# Patient Record
Sex: FEMALE | Race: BLACK OR AFRICAN AMERICAN | Employment: OTHER | URBAN - METROPOLITAN AREA
[De-identification: names, ages, dates, MRNs, and addresses within clinical notes are randomized per-mention and may not be internally consistent; named-entity substitution may affect disease eponyms.]

---

## 2022-07-05 ENCOUNTER — APPOINTMENT (OUTPATIENT)
Dept: GENERAL RADIOLOGY | Age: 67
End: 2022-07-05
Payer: MEDICARE

## 2022-07-05 ENCOUNTER — HOSPITAL ENCOUNTER (EMERGENCY)
Age: 67
Discharge: HOME | End: 2022-07-05
Attending: EMERGENCY MEDICINE
Payer: MEDICARE

## 2022-07-05 VITALS
RESPIRATION RATE: 17 BRPM | OXYGEN SATURATION: 96 % | SYSTOLIC BLOOD PRESSURE: 146 MMHG | BODY MASS INDEX: 27.64 KG/M2 | WEIGHT: 156 LBS | HEART RATE: 94 BPM | HEIGHT: 63 IN | DIASTOLIC BLOOD PRESSURE: 79 MMHG | TEMPERATURE: 97.7 F

## 2022-07-05 DIAGNOSIS — U07.1 COVID-19: Primary | ICD-10-CM

## 2022-07-05 LAB
RAPID INFLUENZA  B AGN: NEGATIVE
RAPID INFLUENZA A AGN: NEGATIVE
SARS-COV-2, NAAT: DETECTED
SOURCE: ABNORMAL

## 2022-07-05 PROCEDURE — 87635 SARS-COV-2 COVID-19 AMP PRB: CPT

## 2022-07-05 PROCEDURE — 96372 THER/PROPH/DIAG INJ SC/IM: CPT

## 2022-07-05 PROCEDURE — 6360000002 HC RX W HCPCS: Performed by: EMERGENCY MEDICINE

## 2022-07-05 PROCEDURE — 99284 EMERGENCY DEPT VISIT MOD MDM: CPT

## 2022-07-05 PROCEDURE — 87804 INFLUENZA ASSAY W/OPTIC: CPT

## 2022-07-05 PROCEDURE — 71045 X-RAY EXAM CHEST 1 VIEW: CPT

## 2022-07-05 RX ORDER — SPIRONOLACTONE 50 MG/1
1 TABLET, FILM COATED ORAL DAILY
COMMUNITY
Start: 2022-06-21

## 2022-07-05 RX ORDER — KETOROLAC TROMETHAMINE 30 MG/ML
30 INJECTION, SOLUTION INTRAMUSCULAR; INTRAVENOUS ONCE
Status: COMPLETED | OUTPATIENT
Start: 2022-07-05 | End: 2022-07-05

## 2022-07-05 RX ADMIN — KETOROLAC TROMETHAMINE 30 MG: 30 INJECTION, SOLUTION INTRAMUSCULAR; INTRAVENOUS at 20:09

## 2022-07-05 ASSESSMENT — ENCOUNTER SYMPTOMS
SORE THROAT: 0
SINUS PRESSURE: 0
RHINORRHEA: 1
DIARRHEA: 0
VOMITING: 0
NAUSEA: 0
SHORTNESS OF BREATH: 0
ABDOMINAL PAIN: 0
WHEEZING: 0
CONSTIPATION: 0
COUGH: 1

## 2022-07-05 ASSESSMENT — PAIN DESCRIPTION - LOCATION: LOCATION: OTHER (COMMENT);GENERALIZED

## 2022-07-05 ASSESSMENT — PAIN SCALES - GENERAL: PAINLEVEL_OUTOF10: 4

## 2022-07-05 NOTE — ED PROVIDER NOTES
Emergency Department Encounter    Patient: Hanna Oliver  MRN: [id number]  : 1955  Date of Evaluation: 2022  ED Provider:  [id number] [facility],     Triage Chief Complaint:   Headache, Fatigue, and Nausea    HPI:  Hanna Oliver is a 78 y.o. female medical history significant for hypertension and hyperlipidemia who presents with URI symptoms. Patient has had 24 hours of cough, congestion with associated headache and myalgia. Patient did have 1 episode of watery diarrhea. She does have sick contacts, Jamir Cervantes does have similar symptoms. She is vaccinated for COVID-19. She describes her headache as \"slight. \"  Denies any fever, chills, chest pain, shortness of breath, palpitations, emesis, dysuria, abdominal pain. ROS:  Review of Systems   Constitutional: Negative for chills and fever. HENT: Positive for congestion and rhinorrhea. Negative for sinus pressure and sore throat. Eyes: Negative for visual disturbance. Respiratory: Positive for cough. Negative for shortness of breath and wheezing. Cardiovascular: Negative for chest pain and palpitations. Gastrointestinal: Negative for abdominal pain, constipation, diarrhea, nausea and vomiting. Genitourinary: Negative for dysuria, frequency and urgency. Musculoskeletal: Positive for myalgias. Negative for arthralgias. Skin: Negative for rash. Neurological: Positive for headaches. Negative for dizziness and syncope. Psychiatric/Behavioral: Negative for agitation, behavioral problems and confusion. Past Medical History:   Diagnosis Date    Hyperlipidemia     Hypertension      Past Surgical History:   Procedure Laterality Date    COLONOSCOPY      HYSTERECTOMY (CERVIX STATUS UNKNOWN)      TONSILLECTOMY      TUMOR REMOVAL      abdominal, benign     No family history on file. Social History     Socioeconomic History    Marital status:       Spouse name: Not on file    Number of children: Not on file    Years of education: Not on file    Highest education level: Not on file   Occupational History    Not on file   Tobacco Use    Smoking status: Former Smoker    Smokeless tobacco: Never Used   Vaping Use    Vaping Use: Never used   Substance and Sexual Activity    Alcohol use: Yes     Comment: occas    Drug use: No    Sexual activity: Not on file   Other Topics Concern    Not on file   Social History Narrative    Not on file     Social Determinants of Health     Financial Resource Strain:     Difficulty of Paying Living Expenses: Not on file   Food Insecurity:     Worried About Running Out of Food in the Last Year: Not on file    Giovanny of Food in the Last Year: Not on file   Transportation Needs:     Lack of Transportation (Medical): Not on file    Lack of Transportation (Non-Medical): Not on file   Physical Activity:     Days of Exercise per Week: Not on file    Minutes of Exercise per Session: Not on file   Stress:     Feeling of Stress : Not on file   Social Connections:     Frequency of Communication with Friends and Family: Not on file    Frequency of Social Gatherings with Friends and Family: Not on file    Attends Gnosticist Services: Not on file    Active Member of [employer] or Organizations: Not on file    Attends Club or Organization Meetings: Not on file    Marital Status: Not on file   Intimate Partner Violence:     Fear of Current or Ex-Partner: Not on file    Emotionally Abused: Not on file    Physically Abused: Not on file    Sexually Abused: Not on file   Housing Stability:     Unable to Pay for Housing in the Last Year: Not on file    Number of [place] in the Last Year: Not on file    Unstable Housing in the Last Year: Not on file     No current facility-administered medications for this encounter.      Current Outpatient Medications   Medication Sig Dispense Refill    spironolactone (ALDACTONE) 50 MG tablet Take 1 tablet by mouth daily      losartan (COZAAR) 100 MG tablet Take 100 mg by mouth daily      estradiol (ESTRACE) 1 MG tablet Take 1 mg by mouth daily      chlorthalidone (HYGROTEN) 50 MG tablet Take 50 mg by mouth daily (Patient not taking: Reported on 7/5/2022)      rosuvastatin (CRESTOR) 20 MG tablet Take 20 mg by mouth daily      POTASSIUM CITRATE PO Take 20 mEq by mouth (Patient not taking: Reported on 7/5/2022)      Cholecalciferol (VITAMIN D-3 PO) Take by mouth daily      MULTIPLE VITAMIN PO Take by mouth daily      Multiple Vitamins-Minerals (HAIR VITAMINS PO) Take by mouth daily       No Known Allergies    Nursing Notes Reviewed    Physical Exam:  Triage VS:    ED Triage Vitals   Enc Vitals Group      BP 07/05/22 1845 (!) 146/79      Heart Rate 07/05/22 1844 94      Resp 07/05/22 1844 17      Temp 07/05/22 1844 97.7 °F (36.5 °C)      Temp Source 07/05/22 1844 Infrared      SpO2 07/05/22 1844 96 %      Weight 07/05/22 1844 156 lb (70.8 kg)      Height 07/05/22 1844 5' 3\" (1.6 m)      Head Circumference --       Peak Flow --       Pain Score --       Pain Loc --       Pain Edu? --       Excl. in [address]? --      Physical Exam  Vitals and nursing note reviewed. Constitutional:       General: She is not in acute distress. Appearance: Normal appearance. She is not ill-appearing or toxic-appearing. HENT:      Head: Normocephalic and atraumatic. Nose: Nose normal.      Mouth/Throat:      Mouth: Mucous membranes are moist.   Eyes:      Conjunctiva/sclera: Conjunctivae normal.   Cardiovascular:      Rate and Rhythm: Normal rate and regular rhythm. Pulses: Normal pulses. Pulmonary:      Effort: Pulmonary effort is normal. No respiratory distress. Breath sounds: Normal breath sounds. No wheezing, rhonchi or rales. Abdominal:      General: Abdomen is flat. Bowel sounds are normal. There is no distension. Palpations: Abdomen is soft. Tenderness: There is no abdominal tenderness. There is no guarding or rebound.    Musculoskeletal: General: Normal range of motion. Skin:     General: Skin is warm. Capillary Refill: Capillary refill takes less than 2 seconds. Neurological:      Mental Status: She is alert and oriented to person, place, and time. Mental status is at baseline. Psychiatric:         Mood and Affect: Mood normal.              I have reviewed and interpreted all of the currently available lab results from this visit (if applicable):  Results for orders placed or performed during the hospital encounter of 07/05/22   Rapid Flu Swab    Specimen: Nasopharyngeal   Result Value Ref Range    Rapid Influenza A Ag NEGATIVE NEGATIVE    Rapid Influenza B Ag NEGATIVE NEGATIVE   COVID-19, Rapid    Specimen: Nasopharyngeal   Result Value Ref Range    Source THROAT     SARS-CoV-2, NAAT DETECTED (A) NOT DETECTED      Radiographs (if obtained):    [] Radiologist's Report Reviewed:  XR CHEST PORTABLE   Final Result   No radiographic evidence of acute cardiopulmonary disease. Chart review shows recent radiographs:  No results found. MDM:  Patient seen and examined. Imaging obtained. Chest x-ray with no acute disease. Influenza negative. COVID-positive. Patient is well-appearing, nontoxic-appearing. She has had 3 total doses of the COVID-vaccine. She is unsure when her last dose of the vaccine was. She is afebrile, not tachycardic, not tachypneic, 96% on room air, blood pressure within acceptable limits. At this time I do think patient is appropriate for outpatient follow-up. Given patient's advanced age, medical comorbidities, she may qualify for Paxlovid. I did discuss risks and benefits of this medication with patient, and she is agreeable to this. Medication sent to [facility] medical outpatient pharmacy. Patient to follow-up with primary care provider in 1 to 2 days for video visit.   She is to return to emergency department if symptoms worsen, return precautions are discussed, she does verbalize understanding of these. All questions were answered, she is agreeable with plan of care. Clinical Impression:  1. COVID-19      Disposition referral (if applicable):  Evelyn Damian MD  [address] [place] [address] [id number]    Schedule an appointment as soon as possible for a visit in 2 days      [facility]  [address]  [phone]  Go to   As needed, If symptoms worsen    Disposition medications (if applicable):  Discharge Medication List as of 7/5/2022  9:31 PM      START taking these medications    Details   nirmatrelvir/ritonavir (PAXLOVID) 20 x 150 MG & 10 x 100MG Take 3 tablets (two 150 mg nirmatrelvir and one 100 mg ritonavir tablets) by mouth every 12 hours for 5 days. , Disp-30 tablet, R-0Normal             Comment: Please note this report has been produced using speech recognition software and may contain errors related to that system including errors in grammar, punctuation, and spelling, as well as words and phrases that may be inappropriate. Efforts were made to edit the dictations.        [id number] [facility],   07/05/22 5885

## 2023-04-07 ENCOUNTER — HOSPITAL ENCOUNTER (EMERGENCY)
Age: 68
Discharge: HOME | End: 2023-04-07
Attending: EMERGENCY MEDICINE
Payer: MEDICARE

## 2023-04-07 ENCOUNTER — APPOINTMENT (OUTPATIENT)
Dept: GENERAL RADIOLOGY | Age: 68
End: 2023-04-07
Payer: MEDICARE

## 2023-04-07 VITALS
WEIGHT: 157 LBS | RESPIRATION RATE: 18 BRPM | TEMPERATURE: 97.8 F | BODY MASS INDEX: 27.82 KG/M2 | HEIGHT: 63 IN | OXYGEN SATURATION: 97 % | DIASTOLIC BLOOD PRESSURE: 83 MMHG | SYSTOLIC BLOOD PRESSURE: 164 MMHG | HEART RATE: 87 BPM

## 2023-04-07 DIAGNOSIS — G57.01 PYRIFORMIS SYNDROME, RIGHT: Primary | ICD-10-CM

## 2023-04-07 PROCEDURE — 73502 X-RAY EXAM HIP UNI 2-3 VIEWS: CPT

## 2023-04-07 PROCEDURE — 99284 EMERGENCY DEPT VISIT MOD MDM: CPT

## 2023-04-07 PROCEDURE — 6370000000 HC RX 637 (ALT 250 FOR IP): Performed by: EMERGENCY MEDICINE

## 2023-04-07 PROCEDURE — 96372 THER/PROPH/DIAG INJ SC/IM: CPT

## 2023-04-07 PROCEDURE — 6360000002 HC RX W HCPCS: Performed by: EMERGENCY MEDICINE

## 2023-04-07 RX ORDER — METHOCARBAMOL 500 MG/1
500 TABLET, FILM COATED ORAL 3 TIMES DAILY
Qty: 30 TABLET | Refills: 0 | Status: SHIPPED | OUTPATIENT
Start: 2023-04-07 | End: 2023-04-17

## 2023-04-07 RX ORDER — HYDROCODONE BITARTRATE AND ACETAMINOPHEN 5; 325 MG/1; MG/1
1 TABLET ORAL ONCE
Status: COMPLETED | OUTPATIENT
Start: 2023-04-07 | End: 2023-04-07

## 2023-04-07 RX ORDER — LIDOCAINE 4 G/G
1 PATCH TOPICAL DAILY
Status: DISCONTINUED | OUTPATIENT
Start: 2023-04-07 | End: 2023-04-07 | Stop reason: HOSPADM

## 2023-04-07 RX ORDER — LIDOCAINE 50 MG/G
1 PATCH TOPICAL DAILY
Qty: 10 PATCH | Refills: 0 | Status: SHIPPED | OUTPATIENT
Start: 2023-04-07 | End: 2023-04-17

## 2023-04-07 RX ORDER — KETOROLAC TROMETHAMINE 30 MG/ML
30 INJECTION, SOLUTION INTRAMUSCULAR; INTRAVENOUS ONCE
Status: COMPLETED | OUTPATIENT
Start: 2023-04-07 | End: 2023-04-07

## 2023-04-07 RX ORDER — ORPHENADRINE CITRATE 30 MG/ML
60 INJECTION INTRAMUSCULAR; INTRAVENOUS ONCE
Status: COMPLETED | OUTPATIENT
Start: 2023-04-07 | End: 2023-04-07

## 2023-04-07 RX ORDER — HYDROCODONE BITARTRATE AND ACETAMINOPHEN 5; 325 MG/1; MG/1
1 TABLET ORAL EVERY 6 HOURS PRN
Qty: 12 TABLET | Refills: 0 | Status: SHIPPED | OUTPATIENT
Start: 2023-04-07 | End: 2023-04-10

## 2023-04-07 RX ORDER — PREDNISONE 20 MG/1
20 TABLET ORAL 2 TIMES DAILY
Qty: 10 TABLET | Refills: 0 | Status: SHIPPED | OUTPATIENT
Start: 2023-04-07 | End: 2023-04-12

## 2023-04-07 RX ORDER — DEXAMETHASONE SODIUM PHOSPHATE 4 MG/ML
8 INJECTION, SOLUTION INTRA-ARTICULAR; INTRALESIONAL; INTRAMUSCULAR; INTRAVENOUS; SOFT TISSUE ONCE
Status: COMPLETED | OUTPATIENT
Start: 2023-04-07 | End: 2023-04-07

## 2023-04-07 RX ADMIN — ORPHENADRINE CITRATE 60 MG: 30 INJECTION INTRAMUSCULAR; INTRAVENOUS at 16:27

## 2023-04-07 RX ADMIN — DEXAMETHASONE SODIUM PHOSPHATE 8 MG: 4 INJECTION, SOLUTION INTRAMUSCULAR; INTRAVENOUS at 16:27

## 2023-04-07 RX ADMIN — KETOROLAC TROMETHAMINE 30 MG: 30 INJECTION, SOLUTION INTRAMUSCULAR at 16:24

## 2023-04-07 RX ADMIN — HYDROCODONE BITARTRATE AND ACETAMINOPHEN 1 TABLET: 5; 325 TABLET ORAL at 16:23

## 2023-04-07 ASSESSMENT — PAIN - FUNCTIONAL ASSESSMENT: PAIN_FUNCTIONAL_ASSESSMENT: 0-10

## 2023-04-07 ASSESSMENT — PAIN SCALES - GENERAL: PAINLEVEL_OUTOF10: 10

## 2023-04-07 ASSESSMENT — PAIN DESCRIPTION - ORIENTATION: ORIENTATION: RIGHT

## 2023-04-07 ASSESSMENT — PAIN DESCRIPTION - LOCATION: LOCATION: HIP

## 2023-04-07 NOTE — ED PROVIDER NOTES
Emergency Department Encounter    Patient: Tiffanie Ponce  MRN: [id number]  : 1955  Date of Evaluation: 2023  ED Provider:  Julianna Almendarez DO    Triage Chief Complaint:   Hip Pain    [place]:  Tiffanie Ponce is a 75 y.o. female with history of hypertension, hyperlipidemia that presents to the emergency department complaint of right hip and groin pain and right buttock pain. Patient states symptoms started 2 days ago after doing some gym exercises. Patient gives the history. Patient states she goes to the gym maybe doing some new exercises balance training. Patient states she has been using a ball to sit on and then raise the right ankle going from each side to each side. She states she got home that evening pain in the right hip and buttock. States no falls injuries or trauma. She states she was not doing any heavy lifting pulling or straining. Patient states never had any right hip surgeries dislocations of the in the past.  Patient states area was sore she did take some Aleve. She states try to use heat today. Patient had pain 10 out of 10 on the pain scale with movement of try to stand and bear weight on the right lower extremity. She states she used her 's cane today. Patient states no pain in her back. Patient states last Aleve was 7:00 this morning for pain no other medications. Patient denies any numbness and tingling weakness in extremities. Patient denies any other chest pain short of breath nausea vomiting diarrhea abdominal pain dysuria hematuria fever chills or cough. Patient here for evaluation.     ROS - see HPI, below listed is current ROS at time of my eval:  General:  No fevers, no chills, no weakness  Eyes:  No recent vison changes, no discharge  ENT:  No sore throat, no nasal congestion, no hearing changes  Cardiovascular:  No chest pain, no palpitations  Respiratory:  No shortness of breath, no cough, no wheezing  Gastrointestinal:  No pain, no nausea, no

## 2023-04-07 NOTE — DISCHARGE INSTRUCTIONS
Follow-up with your primary care physician in 3 days for reevaluation. Call for an appointment  Apply Lidoderm to affected area twice a day  Take prednisone as prescribed for inflammation pain and swelling  Take Robaxin muscle relaxant as prescribed and directed. No drink or drive while taking  Take Norco as needed for pain. No drink or drive while taking  Rest ice or heat to the affected area  Return to the emergency department immediately pain fever chills nausea vomiting inability ambulate bowel or bladder incontinence or any worsening symptoms.

## 2023-06-10 ENCOUNTER — HOSPITAL ENCOUNTER (EMERGENCY)
Age: 68
Discharge: HOME | End: 2023-06-10
Attending: EMERGENCY MEDICINE
Payer: MEDICARE

## 2023-06-10 VITALS
HEART RATE: 76 BPM | WEIGHT: 155 LBS | DIASTOLIC BLOOD PRESSURE: 95 MMHG | SYSTOLIC BLOOD PRESSURE: 149 MMHG | HEIGHT: 63 IN | TEMPERATURE: 97.6 F | OXYGEN SATURATION: 98 % | BODY MASS INDEX: 27.46 KG/M2 | RESPIRATION RATE: 16 BRPM

## 2023-06-10 DIAGNOSIS — R09.81 NASAL CONGESTION: Primary | ICD-10-CM

## 2023-06-10 LAB
SARS-COV-2 RDRP RESP QL NAA+PROBE: NOT DETECTED
SOURCE: NORMAL

## 2023-06-10 PROCEDURE — 87635 SARS-COV-2 COVID-19 AMP PRB: CPT

## 2023-06-10 RX ORDER — GUAIFENESIN 600 MG/1
1200 TABLET, EXTENDED RELEASE ORAL 2 TIMES DAILY
Qty: 40 TABLET | Refills: 0 | Status: SHIPPED | OUTPATIENT
Start: 2023-06-10 | End: 2023-06-20

## 2023-06-10 RX ORDER — CETIRIZINE HYDROCHLORIDE 10 MG/1
10 TABLET ORAL DAILY
Qty: 30 TABLET | Refills: 0 | Status: SHIPPED | OUTPATIENT
Start: 2023-06-10 | End: 2023-07-10

## 2023-06-10 NOTE — ED PROVIDER NOTES
Emergency Department Encounter    Patient: Alfonso Richardson  MRN: [id number]  : 1955  Date of Evaluation: 6/10/2023  ED Provider:  Zoltan Reed MD    MDM:    Clinical Impression:  1. Nasal congestion          Triage Chief Complaint: Congestion (Reports concern for covid. Reports  tested positive from home test)      Patient presents with nasal congestion and exposure to COVID-19 as below. I completed a structured, evidence-based clinical evaluation to screen for acute emergent condition that poses a threat to life or bodily function. Diagnostic studies/Differential diagnosis included: (with independent interpretations \"as interpreted by me\" and tests considered but not performed) COVID-19 test is negative. Patient has some nasal congestion and also history of seasonal allergies as below. Otherwise patient has no symptoms of COVID-19. Patient will be treated with Zyrtec and guaifenesin. Patient has no evidence of pneumonia, sinusitis meeting criteria for antibiotics. Medications ordered in the ED:  ED Medication Orders (From admission, onward)      None            PLAN  Disposition: Patient will be discharged with strict return precautions and follow up instructions. Decision To Discharge 06/10/2023 01:37:03 PM     Disposition referral (if applicable):  Loretta Giles MD  [address] [place] [address] [phone]    Call in 2 days        Medications prescribed (if applicable):  New Prescriptions    CETIRIZINE (ZYRTEC) 10 MG TABLET    Take 1 tablet by mouth daily    GUAIFENESIN (MUCINEX) 600 MG EXTENDED RELEASE TABLET    Take 2 tablets by mouth 2 times daily for 10 days           ===================================================================    HPI/Pertinent ROS:  Alfonso Richardson is a 75 y.o. female that presents requesting COVID-19 test as her  took a home test which was positive.   Patient states that she has had 5 days of nasal congestion which she

## 2023-06-10 NOTE — DISCHARGE INSTRUCTIONS
Return immediately to the emergency department if you experience new or worsened symptoms, chest pain, shortness of breath, cough, body aches, fever, or for any other concerns.

## 2024-04-21 ENCOUNTER — HOSPITAL ENCOUNTER (EMERGENCY)
Age: 69
Discharge: HOME | End: 2024-04-21
Attending: EMERGENCY MEDICINE
Payer: MEDICARE

## 2024-04-21 VITALS
WEIGHT: 158 LBS | SYSTOLIC BLOOD PRESSURE: 151 MMHG | HEART RATE: 78 BPM | DIASTOLIC BLOOD PRESSURE: 89 MMHG | RESPIRATION RATE: 16 BRPM | HEIGHT: 63 IN | OXYGEN SATURATION: 98 % | TEMPERATURE: 98.2 F | BODY MASS INDEX: 28 KG/M2

## 2024-04-21 DIAGNOSIS — L03.011 CELLULITIS OF FINGER OF RIGHT HAND: Primary | ICD-10-CM
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
## 2024-04-21 PROCEDURE — 6370000000 HC RX 637 (ALT 250 FOR IP): Performed by: EMERGENCY MEDICINE

## 2024-04-21 PROCEDURE — 99283 EMERGENCY DEPT VISIT LOW MDM: CPT

## 2024-04-21 RX ORDER — DOXYCYCLINE HYCLATE 100 MG
100 TABLET ORAL 2 TIMES DAILY
Qty: 14 TABLET | Refills: 0 | Status: SHIPPED | OUTPATIENT
Start: 2024-04-21 | End: 2024-04-28

## 2024-04-21 RX ORDER — DOXYCYCLINE HYCLATE 100 MG
100 TABLET ORAL ONCE
Status: COMPLETED | OUTPATIENT
Start: 2024-04-21 | End: 2024-04-21

## 2024-04-21 RX ADMIN — DOXYCYCLINE HYCLATE 100 MG: 100 TABLET, COATED ORAL at 20:10

## 2024-04-21 ASSESSMENT — PAIN - FUNCTIONAL ASSESSMENT: PAIN_FUNCTIONAL_ASSESSMENT: 0-10

## 2024-04-21 ASSESSMENT — PAIN SCALES - GENERAL: PAINLEVEL_OUTOF10: 7

## 2024-04-21 ASSESSMENT — PAIN DESCRIPTION - LOCATION: LOCATION: FINGER (COMMENT WHICH ONE)

## 2024-04-21 NOTE — ED PROVIDER NOTES
CHIEF COMPLAINT    Chief Complaint   Patient presents with    Ring Removal     Right forth finger swollen and red     HPI  April Diaz is a 68 y.o. female who presents to the ED with complaints of redness, pain, and swelling to right ring finger and now with ring being stuck on finger.  Patient states that 5 days ago she noticed a mild burning satiation along the proximal phalanx of the right ring finger.  The next day she noticed a area of redness and swelling with what she describes as a white headed lesion that she thought was a pimple.  She attempted to poke this area with a needle 4 days ago and subsequent to this began to have some increased swelling and pain on the proximal phalanx of the right ring finger to the point that she was unable to remove her ring over the last 2 days.  Her pain is described as a 7/10 throbbing stabbing pain that is constant and exacerbated by palpation and movement.  Nothing makes it better.  Pain does not radiate.  Symptoms are constant.  Denies numbness, tingling, fevers, chills, nausea, vomiting      REVIEW OF SYSTEMS  Constitutional: No fever, chills   Eye: No visual changes  HENT: No earache or sore throat.  Resp: No SOB or productive cough.  Cardio: No chest pain or palpitations.  GI: No abdominal pain, nausea, vomiting, constipation or diarrhea. No melena.  : No dysuria, urgency or frequency.  Endocrine: No heat intolerance, no cold intolerance, no polydipsia   Lymphatics: No adenopathy  Musculoskeletal: Complains of redness, swelling, and pain to right ring finger with ring stuck on finger  Neuro: No headaches.  Psych: No homicidal or suicidal thoughts  Skin: No rash, No itching.  ?  ?  PAST MEDICAL HISTORY  Past Medical History:   Diagnosis Date    Hyperlipidemia     Hypertension      FAMILY HISTORY  History reviewed. No pertinent family history.  SOCIAL HISTORY  Social History     Socioeconomic History    Marital status:      Spouse name: None    Number of

## 2024-12-02 ENCOUNTER — TELEPHONE (OUTPATIENT)
Dept: SURGERY | Age: 69
End: 2024-12-02

## 2025-01-09 NOTE — ED NOTES
Attempted to reach patient. No answer. Left detailed message notifying patient of Dr. Parmar's comments and recommendations.    Patient encouraged to call office back if any questions. Office phone number provided.      Pt verbalized understanding of discharge medications/side effects and follow-up care/instructions, denies any questions or concerns at this time. Pt provided with paper prescriptions; encouraged to return to the ED for any new or worsening symptoms.      Nakia Ga RN  04/07/23 [id number]